# Patient Record
Sex: FEMALE | Race: WHITE | URBAN - METROPOLITAN AREA
[De-identification: names, ages, dates, MRNs, and addresses within clinical notes are randomized per-mention and may not be internally consistent; named-entity substitution may affect disease eponyms.]

---
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
## 2023-08-31 NOTE — PROCEDURE: ORDER TESTS
Expected Date Of Service: 01/07/2021
Bill For Surgical Tray: no
Billing Type: Third-Party Bill
Patient requests all Lab, Cardiology, and Radiology Results on their Discharge Instructions